# Patient Record
Sex: FEMALE | Race: WHITE | NOT HISPANIC OR LATINO | URBAN - METROPOLITAN AREA
[De-identification: names, ages, dates, MRNs, and addresses within clinical notes are randomized per-mention and may not be internally consistent; named-entity substitution may affect disease eponyms.]

---

## 2018-06-04 ENCOUNTER — INPATIENT (INPATIENT)
Facility: HOSPITAL | Age: 33
LOS: 1 days | Discharge: HOME | End: 2018-06-06
Attending: OBSTETRICS & GYNECOLOGY | Admitting: OBSTETRICS & GYNECOLOGY
Payer: COMMERCIAL

## 2018-06-04 VITALS — WEIGHT: 179.9 LBS | HEIGHT: 66 IN

## 2018-06-04 LAB
ALBUMIN SERPL ELPH-MCNC: 3.7 G/DL — SIGNIFICANT CHANGE UP (ref 3.3–5)
ALP SERPL-CCNC: 113 U/L — SIGNIFICANT CHANGE UP (ref 40–120)
ALT FLD-CCNC: 12 U/L — SIGNIFICANT CHANGE UP (ref 10–45)
ANION GAP SERPL CALC-SCNC: 17 MMOL/L — SIGNIFICANT CHANGE UP (ref 5–17)
APPEARANCE UR: CLEAR — SIGNIFICANT CHANGE UP
AST SERPL-CCNC: 20 U/L — SIGNIFICANT CHANGE UP (ref 10–40)
BACTERIA # UR AUTO: PRESENT /HPF
BASOPHILS NFR BLD AUTO: 0.2 % — SIGNIFICANT CHANGE UP (ref 0–2)
BILIRUB SERPL-MCNC: 0.3 MG/DL — SIGNIFICANT CHANGE UP (ref 0.2–1.2)
BILIRUB UR-MCNC: NEGATIVE — SIGNIFICANT CHANGE UP
BLD GP AB SCN SERPL QL: NEGATIVE — SIGNIFICANT CHANGE UP
BUN SERPL-MCNC: 7 MG/DL — SIGNIFICANT CHANGE UP (ref 7–23)
CALCIUM SERPL-MCNC: 9.4 MG/DL — SIGNIFICANT CHANGE UP (ref 8.4–10.5)
CHLORIDE SERPL-SCNC: 100 MMOL/L — SIGNIFICANT CHANGE UP (ref 96–108)
CO2 SERPL-SCNC: 18 MMOL/L — LOW (ref 22–31)
COLOR SPEC: YELLOW — SIGNIFICANT CHANGE UP
COMMENT - URINE: SIGNIFICANT CHANGE UP
CREAT SERPL-MCNC: 0.51 MG/DL — SIGNIFICANT CHANGE UP (ref 0.5–1.3)
DIFF PNL FLD: ABNORMAL
EOSINOPHIL NFR BLD AUTO: 0.4 % — SIGNIFICANT CHANGE UP (ref 0–6)
EPI CELLS # UR: SIGNIFICANT CHANGE UP /HPF (ref 0–5)
GLUCOSE SERPL-MCNC: 75 MG/DL — SIGNIFICANT CHANGE UP (ref 70–99)
GLUCOSE UR QL: NEGATIVE — SIGNIFICANT CHANGE UP
HCT VFR BLD CALC: 38.5 % — SIGNIFICANT CHANGE UP (ref 34.5–45)
HGB BLD-MCNC: 13.6 G/DL — SIGNIFICANT CHANGE UP (ref 11.5–15.5)
KETONES UR-MCNC: >=80 MG/DL
LEUKOCYTE ESTERASE UR-ACNC: NEGATIVE — SIGNIFICANT CHANGE UP
LYMPHOCYTES # BLD AUTO: 16 % — SIGNIFICANT CHANGE UP (ref 13–44)
MCHC RBC-ENTMCNC: 31.7 PG — SIGNIFICANT CHANGE UP (ref 27–34)
MCHC RBC-ENTMCNC: 35.3 G/DL — SIGNIFICANT CHANGE UP (ref 32–36)
MCV RBC AUTO: 89.7 FL — SIGNIFICANT CHANGE UP (ref 80–100)
MONOCYTES NFR BLD AUTO: 7 % — SIGNIFICANT CHANGE UP (ref 2–14)
NEUTROPHILS NFR BLD AUTO: 76.4 % — SIGNIFICANT CHANGE UP (ref 43–77)
NITRITE UR-MCNC: NEGATIVE — SIGNIFICANT CHANGE UP
PH UR: 7 — SIGNIFICANT CHANGE UP (ref 5–8)
PLATELET # BLD AUTO: 185 K/UL — SIGNIFICANT CHANGE UP (ref 150–400)
POTASSIUM SERPL-MCNC: 3.7 MMOL/L — SIGNIFICANT CHANGE UP (ref 3.5–5.3)
POTASSIUM SERPL-SCNC: 3.7 MMOL/L — SIGNIFICANT CHANGE UP (ref 3.5–5.3)
PROT SERPL-MCNC: 7.3 G/DL — SIGNIFICANT CHANGE UP (ref 6–8.3)
PROT UR-MCNC: NEGATIVE MG/DL — SIGNIFICANT CHANGE UP
RBC # BLD: 4.29 M/UL — SIGNIFICANT CHANGE UP (ref 3.8–5.2)
RBC # FLD: 12 % — SIGNIFICANT CHANGE UP (ref 10.3–16.9)
RBC CASTS # UR COMP ASSIST: > 10 /HPF
RH IG SCN BLD-IMP: POSITIVE — SIGNIFICANT CHANGE UP
RH IG SCN BLD-IMP: POSITIVE — SIGNIFICANT CHANGE UP
SODIUM SERPL-SCNC: 135 MMOL/L — SIGNIFICANT CHANGE UP (ref 135–145)
SP GR SPEC: 1.01 — SIGNIFICANT CHANGE UP (ref 1–1.03)
UROBILINOGEN FLD QL: 0.2 E.U./DL — SIGNIFICANT CHANGE UP
WBC # BLD: 14 K/UL — HIGH (ref 3.8–10.5)
WBC # FLD AUTO: 14 K/UL — HIGH (ref 3.8–10.5)
WBC UR QL: ABNORMAL /HPF

## 2018-06-04 RX ORDER — SODIUM CHLORIDE 9 MG/ML
1000 INJECTION, SOLUTION INTRAVENOUS
Qty: 0 | Refills: 0 | Status: DISCONTINUED | OUTPATIENT
Start: 2018-06-04 | End: 2018-06-05

## 2018-06-04 RX ORDER — SIMETHICONE 80 MG/1
80 TABLET, CHEWABLE ORAL EVERY 6 HOURS
Qty: 0 | Refills: 0 | Status: DISCONTINUED | OUTPATIENT
Start: 2018-06-04 | End: 2018-06-06

## 2018-06-04 RX ORDER — TETANUS TOXOID, REDUCED DIPHTHERIA TOXOID AND ACELLULAR PERTUSSIS VACCINE, ADSORBED 5; 2.5; 8; 8; 2.5 [IU]/.5ML; [IU]/.5ML; UG/.5ML; UG/.5ML; UG/.5ML
0.5 SUSPENSION INTRAMUSCULAR ONCE
Qty: 0 | Refills: 0 | Status: DISCONTINUED | OUTPATIENT
Start: 2018-06-04 | End: 2018-06-06

## 2018-06-04 RX ORDER — OXYTOCIN 10 UNIT/ML
333.33 VIAL (ML) INJECTION
Qty: 20 | Refills: 0 | Status: DISCONTINUED | OUTPATIENT
Start: 2018-06-04 | End: 2018-06-05

## 2018-06-04 RX ORDER — IBUPROFEN 200 MG
600 TABLET ORAL EVERY 6 HOURS
Qty: 0 | Refills: 0 | Status: DISCONTINUED | OUTPATIENT
Start: 2018-06-04 | End: 2018-06-06

## 2018-06-04 RX ORDER — SODIUM CHLORIDE 9 MG/ML
1000 INJECTION, SOLUTION INTRAVENOUS ONCE
Qty: 0 | Refills: 0 | Status: DISCONTINUED | OUTPATIENT
Start: 2018-06-04 | End: 2018-06-05

## 2018-06-04 RX ORDER — ACETAMINOPHEN 500 MG
650 TABLET ORAL EVERY 6 HOURS
Qty: 0 | Refills: 0 | Status: DISCONTINUED | OUTPATIENT
Start: 2018-06-04 | End: 2018-06-06

## 2018-06-04 RX ORDER — GLYCERIN ADULT
1 SUPPOSITORY, RECTAL RECTAL AT BEDTIME
Qty: 0 | Refills: 0 | Status: DISCONTINUED | OUTPATIENT
Start: 2018-06-04 | End: 2018-06-06

## 2018-06-04 RX ORDER — OXYTOCIN 10 UNIT/ML
41.67 VIAL (ML) INJECTION
Qty: 20 | Refills: 0 | Status: DISCONTINUED | OUTPATIENT
Start: 2018-06-04 | End: 2018-06-06

## 2018-06-04 RX ORDER — PRAMOXINE HYDROCHLORIDE 150 MG/15G
1 AEROSOL, FOAM RECTAL EVERY 4 HOURS
Qty: 0 | Refills: 0 | Status: DISCONTINUED | OUTPATIENT
Start: 2018-06-04 | End: 2018-06-06

## 2018-06-04 RX ORDER — MAGNESIUM HYDROXIDE 400 MG/1
30 TABLET, CHEWABLE ORAL
Qty: 0 | Refills: 0 | Status: DISCONTINUED | OUTPATIENT
Start: 2018-06-04 | End: 2018-06-06

## 2018-06-04 RX ORDER — HYDROCORTISONE 1 %
1 OINTMENT (GRAM) TOPICAL EVERY 4 HOURS
Qty: 0 | Refills: 0 | Status: DISCONTINUED | OUTPATIENT
Start: 2018-06-04 | End: 2018-06-06

## 2018-06-04 RX ORDER — AER TRAVELER 0.5 G/1
1 SOLUTION RECTAL; TOPICAL EVERY 4 HOURS
Qty: 0 | Refills: 0 | Status: DISCONTINUED | OUTPATIENT
Start: 2018-06-04 | End: 2018-06-06

## 2018-06-04 RX ORDER — LANOLIN
1 OINTMENT (GRAM) TOPICAL EVERY 6 HOURS
Qty: 0 | Refills: 0 | Status: DISCONTINUED | OUTPATIENT
Start: 2018-06-04 | End: 2018-06-06

## 2018-06-04 RX ORDER — DIBUCAINE 1 %
1 OINTMENT (GRAM) RECTAL EVERY 4 HOURS
Qty: 0 | Refills: 0 | Status: DISCONTINUED | OUTPATIENT
Start: 2018-06-04 | End: 2018-06-06

## 2018-06-04 RX ORDER — DOCUSATE SODIUM 100 MG
100 CAPSULE ORAL
Qty: 0 | Refills: 0 | Status: DISCONTINUED | OUTPATIENT
Start: 2018-06-04 | End: 2018-06-06

## 2018-06-04 RX ORDER — OXYCODONE AND ACETAMINOPHEN 5; 325 MG/1; MG/1
2 TABLET ORAL EVERY 6 HOURS
Qty: 0 | Refills: 0 | Status: DISCONTINUED | OUTPATIENT
Start: 2018-06-04 | End: 2018-06-05

## 2018-06-04 RX ORDER — DIPHENHYDRAMINE HCL 50 MG
25 CAPSULE ORAL EVERY 6 HOURS
Qty: 0 | Refills: 0 | Status: DISCONTINUED | OUTPATIENT
Start: 2018-06-04 | End: 2018-06-06

## 2018-06-04 RX ADMIN — Medication 125 MILLIUNIT(S)/MIN: at 23:33

## 2018-06-04 RX ADMIN — Medication 600 MILLIGRAM(S): at 23:52

## 2018-06-04 RX ADMIN — SODIUM CHLORIDE 125 MILLILITER(S): 9 INJECTION, SOLUTION INTRAVENOUS at 23:34

## 2018-06-04 NOTE — PROGRESS NOTE ADULT - SUBJECTIVE AND OBJECTIVE BOX
DELIVERY NOTE   [x ]ANESTHESIA	[ ]NONE  [x ]EPIDURAL[ ] COMBINED SPINAL EPIDURAL [ ]SPINAL [ ]LOCAL____________      [x ]AGOSTO [ ]MULTIPLE DELIVERY - BABY # ________  (USE SEPARATE FORM FOR SECOND  INFANT)  								[ ]PEDIATRICS  AT DELIVERY  	Patient fully  dilated  and pushing, [ ] LIVE [ ]NONVIABLE.	  [ ]MALE  [ x] FEMALE  APGARS ____8______AND _______9____ Infant delivered from ___OA___position, over:   [ ]INTACT PERINEUM	  [ ]NUCHAL CORD_____________TIME(S)  [ ] REDUCED	[ ]DOUBLY CLAMPED AND CUT AT PERINEUM  [ ] _____DEGREE LACERATION.  [ x]______RML____ EPISIOTOMY.	[ X]EPISIOTOMY  VAGINAL  EXTENSION      [ X] OPERATIVE VAGINAL DELIVERY:     [ X] VERBAL CONSENT FOR FORCEPS +/- EPISIOTOMY OBTAINED:       INDICATION:__FETAL BRADYCARDIA____________________________________________________.  [ ]VACUUM	___________________TYPE.  	[ X] FORCEPS    INSTRUMENT: ______SIMPSON _________________.  	Applied to  Vertex  in _____OA__________Position  at  ____+3/+3_____________station.   Rotation [X]NONE.  [ ] LESS THAN  45 degrees.  [ ] MORE THAN 45 DEGREES.  Number of  pulls =  _____ONE________________.  [ ]INTACT PERINEUM  [ ] __________DEGREE LACERATION.  [X ]____RML______ EPISIOTOMY.	[X ]EPISIOTOMY EXTENSION  RIGHT  VAGINAL     PEDS  AT  [facility]   PLACENTA DELIVERY:  [x  ]Spontaneously   	[x ] Intact  	[ ]Not-intact  	[  ] Removed manually  	[  ] Surgically removed   		[ ]  Dilatation and curettage  		[ ] Horse curette   		[ ] Other:  UMBILICAL CORD:	[ x] Normal,     #_____3_______ Vessels  noted   			[ ] Abnormal:  [x ] Uterine cavity explored  	[ x]Normal  [ x]Empty  	[ ] Not Empty  	[ ] Other.      REPAIR:  	[ ] NONE.  [ ]Laceration REPAIR :		[ X]Episiotomy  REPAIR:	[ X]Episiotomy Extension REPAIR:  Using: 	[ X]____2-0 CHROMIC _______________SUTURES AND [ X]___3-0 CHROMIC___________ SUTURES [ ]Other ___________.  [ X] In layers	[X ] NOTES:  EMOSTASIS  XCELLENT 		  [ X]Rectal  mucosa  Intact		[ ]Not intact:  [ ] Other :      EBL____400___________cc’s  [x ]  MOTHER  AND INFANT TOLERATED  THE DELIVERY WELL RESTING  IN LDR IN STABLE CONDITION  [ ] INFANT TO NICCU:	  [X ] DELIVERY DISCUSSED  WITH PATIENT/SUPPORT PERSON(S)  [X ]NOTES/COMPLICATIONS:	VERBAL CONSENT FOR OUTLET  FORCEPS  OBTAINED  FROM PATIENT  AND HER   , FETAL  BRADYCARDIA  NOTED,  RAMIREZ  CATHETER  REMOVED  AND NICKERSON   OUTLET  FORCEPS  APPLIED  TO  OA  VERTEX , NO ROTATION PERFORMED.	LIVE  FEMALE  INFANT  DELIVERED  FROM OA  POSITION OVER  RML  EPISIOTOMY. INTACT  PLACENTA  DELIVERED  SPONTANEOUSLY 3  VESSEL  CORD  NOTED.   EPISIOTOMY  AND  VAGINAL EXTENSION  REPAIRED IN LAYERS USING  2-0  AND 3-0  CHROMIC  SUTURES,  HEMOSTASIS EXCELLENT.  RECTAL  MUCOSA  INTACT.   EBL 400CC.  DISCUSSED  DELIVERY  WITH PATIENT AND  HER   IN DETAIL  QUESTIONS ANSWERED  IN DETAIL.

## 2018-06-04 NOTE — DISCHARGE NOTE OB - PATIENT PORTAL LINK FT
You can access the [facility] Portal, offered by [facility], by registering with the following website: [email or web address]

## 2018-06-04 NOTE — DISCHARGE NOTE OB - CARE PLAN
Principal Discharge DX:	Normal postpartum course  Goal:	HOME  Assessment and plan of treatment:	NOTHING PER  VAGINA  FOR  6  WEEKS  NO SEX  NO TUB  BATHS  NO SWIMMING

## 2018-06-04 NOTE — DISCHARGE NOTE OB - CARE PROVIDER_API CALL
Alverto Mohamud), Obstetrics and Gynecology  [address] [place] [address]  Phone: [phone]  Fax: [phone]

## 2018-06-05 LAB
HCT VFR BLD CALC: 23.4 % — LOW (ref 34.5–45)
HCT VFR BLD CALC: 31.6 % — LOW (ref 34.5–45)
HGB BLD-MCNC: 11 G/DL — LOW (ref 11.5–15.5)
HGB BLD-MCNC: 7.9 G/DL — LOW (ref 11.5–15.5)
MCHC RBC-ENTMCNC: 31.7 PG — SIGNIFICANT CHANGE UP (ref 27–34)
MCHC RBC-ENTMCNC: 31.9 PG — SIGNIFICANT CHANGE UP (ref 27–34)
MCHC RBC-ENTMCNC: 33.8 G/DL — SIGNIFICANT CHANGE UP (ref 32–36)
MCHC RBC-ENTMCNC: 34.8 G/DL — SIGNIFICANT CHANGE UP (ref 32–36)
MCV RBC AUTO: 91.6 FL — SIGNIFICANT CHANGE UP (ref 80–100)
MCV RBC AUTO: 94 FL — SIGNIFICANT CHANGE UP (ref 80–100)
PLATELET # BLD AUTO: 125 K/UL — LOW (ref 150–400)
PLATELET # BLD AUTO: 165 K/UL — SIGNIFICANT CHANGE UP (ref 150–400)
RBC # BLD: 2.49 M/UL — LOW (ref 3.8–5.2)
RBC # BLD: 3.45 M/UL — LOW (ref 3.8–5.2)
RBC # FLD: 12.1 % — SIGNIFICANT CHANGE UP (ref 10.3–16.9)
RBC # FLD: 12.2 % — SIGNIFICANT CHANGE UP (ref 10.3–16.9)
T PALLIDUM AB TITR SER: NEGATIVE — SIGNIFICANT CHANGE UP
WBC # BLD: 11.4 K/UL — HIGH (ref 3.8–10.5)
WBC # BLD: 22.2 K/UL — HIGH (ref 3.8–10.5)
WBC # FLD AUTO: 11.4 K/UL — HIGH (ref 3.8–10.5)
WBC # FLD AUTO: 22.2 K/UL — HIGH (ref 3.8–10.5)

## 2018-06-05 RX ORDER — OXYCODONE AND ACETAMINOPHEN 5; 325 MG/1; MG/1
1 TABLET ORAL EVERY 4 HOURS
Qty: 0 | Refills: 0 | Status: DISCONTINUED | OUTPATIENT
Start: 2018-06-05 | End: 2018-06-06

## 2018-06-05 RX ORDER — CARBOPROST TROMETHAMINE 250 UG/ML
250 INJECTION, SOLUTION INTRAMUSCULAR ONCE
Qty: 0 | Refills: 0 | Status: COMPLETED | OUTPATIENT
Start: 2018-06-05 | End: 2018-06-05

## 2018-06-05 RX ADMIN — Medication 0.2 MILLIGRAM(S): at 12:12

## 2018-06-05 RX ADMIN — Medication 125 MILLIUNIT(S)/MIN: at 00:49

## 2018-06-05 RX ADMIN — Medication 600 MILLIGRAM(S): at 01:20

## 2018-06-05 RX ADMIN — Medication 1 TABLET(S): at 09:52

## 2018-06-05 RX ADMIN — OXYCODONE AND ACETAMINOPHEN 1 TABLET(S): 5; 325 TABLET ORAL at 21:21

## 2018-06-05 RX ADMIN — Medication 0.2 MILLIGRAM(S): at 05:51

## 2018-06-05 RX ADMIN — Medication 600 MILLIGRAM(S): at 19:05

## 2018-06-05 RX ADMIN — Medication 600 MILLIGRAM(S): at 12:12

## 2018-06-05 RX ADMIN — OXYCODONE AND ACETAMINOPHEN 1 TABLET(S): 5; 325 TABLET ORAL at 22:20

## 2018-06-05 RX ADMIN — Medication 600 MILLIGRAM(S): at 18:04

## 2018-06-05 RX ADMIN — CARBOPROST TROMETHAMINE 250 MICROGRAM(S): 250 INJECTION, SOLUTION INTRAMUSCULAR at 00:49

## 2018-06-05 RX ADMIN — Medication 600 MILLIGRAM(S): at 13:10

## 2018-06-05 RX ADMIN — Medication 650 MILLIGRAM(S): at 10:45

## 2018-06-05 RX ADMIN — Medication 650 MILLIGRAM(S): at 09:52

## 2018-06-05 RX ADMIN — OXYCODONE AND ACETAMINOPHEN 1 TABLET(S): 5; 325 TABLET ORAL at 15:20

## 2018-06-05 RX ADMIN — Medication 0.2 MILLIGRAM(S): at 18:04

## 2018-06-05 RX ADMIN — Medication 1 APPLICATION(S): at 09:51

## 2018-06-05 RX ADMIN — Medication 600 MILLIGRAM(S): at 05:12

## 2018-06-05 RX ADMIN — Medication 100 MILLIGRAM(S): at 09:52

## 2018-06-05 RX ADMIN — OXYCODONE AND ACETAMINOPHEN 1 TABLET(S): 5; 325 TABLET ORAL at 14:28

## 2018-06-05 RX ADMIN — Medication 0.2 MILLIGRAM(S): at 00:50

## 2018-06-05 RX ADMIN — Medication 100 MILLIGRAM(S): at 18:04

## 2018-06-05 NOTE — PROGRESS NOTE ADULT - ASSESSMENT
A/P  33y   s/p , PPD #1  ,stable  1. Pain: well controlled on OPM  2. GI: Regular diet  3. : voiding spontaneously  4. DVT prophylaxis: Ambulation  5. Dispo: PPD 2, unless otherwise specified A/P  33y   s/p forceps assisted vaginal delivery c/b PPH and gHTN, PPD #1  ,stable  1. PPH: normotensive, asymptomatic  2. gHTN: normotensive  3. Pain: well controlled on OPM  4. GI: Regular diet  5. : voiding spontaneously  6. DVT prophylaxis: Ambulation  7. Dispo: PPD 2, unless otherwise specified

## 2018-06-05 NOTE — LACTATION INITIAL EVALUATION - NS LACT CON REASON FOR REQ
Mother reports baby feeding well with no pain. 11 hours old, 39.1, 2 urine/4 stool diapers. Assisted with latch on left side in cross cradle hold. Instructed in positioning and deep latching techniques. Mother states this latch feels different than previous ones and has no discomfort. Baby fed well with wide gape and rhythmic sucking for ~10 minutes, unlatched herself and fell asleep. Reviewed BF guidelines and answered questions. Provided written materials. Encouraged continued offering of breast and s2s. Mother's mother and sister "never made any breastmilk" but pt's breasts grew in size from A to DD and have been leaking colostrum since her second trimester./primaparous mom

## 2018-06-05 NOTE — PROGRESS NOTE ADULT - SUBJECTIVE AND OBJECTIVE BOX
Patient evaluated at bedside.  No acute events overnight.  She reports pain is well controlled with oPM.  She denies heavy vaginal bleeding or perineal discomfort.  She has been ambulating without assistance, had alexis catheter removed this AM, and is breastfeeding.    Physical Exam:  T(C): 36.6 (06-05-18 @ 04:25), Max: 36.6 (06-05-18 @ 04:25)  HR: 67 (06-05-18 @ 04:25) (64 - 92)  BP: 130/81 (06-05-18 @ 04:25) (93/47 - 140/65)  RR: 17 (06-05-18 @ 04:25) (17 - 18)  SpO2: 100% (06-05-18 @ 04:25) (96% - 100%)  Wt(kg): --    GA: NAD, AAOx3  Abd: soft, nontender, nondistended, no rebound or guarding, uterus firm at midline,   fundus below umbilicus  : lochia WNL  Extremities: no swelling or calf tenderness                            11.0   22.2  )-----------( 165      ( 05 Jun 2018 00:57 )             31.6     06-04    135  |  100  |  7   ----------------------------<  75  3.7   |  18<L>  |  0.51    Ca    9.4      04 Jun 2018 19:45    TPro  7.3  /  Alb  3.7  /  TBili  0.3  /  DBili  x   /  AST  20  /  ALT  12  /  AlkPhos  113  06-04

## 2018-06-05 NOTE — PROGRESS NOTE ADULT - SUBJECTIVE AND OBJECTIVE BOX
Postpartum  day 1     Rounds pt  c/o  "  I  think I  am leaking   and  bleed ing  alot  right  now.    Exam  reveled  distended  bladder,  and  atonic  uterus.  expressed  500  cc clots  and  blood  from uterine cavity.  Methergine  0.2 mg  Im  given, Hemabate 250mcgs   im  given,  and  oxytocin  increased  to  40  u  in  ivf.  after  expressing  uterus  hemostasis  was  excellent.  lacerations visualized    and noted  to be intact.  cbc ordered.  will  monitor  pt .  discussed  uterine  atony  with patient and her  .  possibility  of  requiring   a  blood  transfusion discussed.  PT  and    asked  questions and  are compliant  with   plan.      	  OB/GYN ATTENDING POSTPARTUM ROUNDS                                    Subjective: 33yFemale  Complaints: [x ]None	[ ]Other:      Objective:  		T(C): --  HR: --  BP: --  RR: --  SpO2: --  Wt(kg): --    06-04 @ 07:01  -  06-05 @ 00:19  --------------------------------------------------------  IN: 2400 mL / OUT: 1300 mL / NET: 1100 mL        		General:      NAD 	[x ] Yes 	[ ]No,	 A&O X3	[x ] Yes  [ ] No			  		Abdomen:   Palpation  	[x ]Normal	[ ]Other:	     [   ]other:  BS		[ ]Normal 	[ ]Other:  			  LE:  	Calf tenderness    	[x ]None		[ x]No  Sign of DVT	[ ]Other:  		Lochia:	 		[ x]Normal,	[ ]Other:  		Labs:	                      13.6   14.0  )-----------( 185      ( 04 Jun 2018 18:01 )             38.5   06-04    135  |  100  |  7   ----------------------------<  75  3.7   |  18<L>  |  0.51    Ca    9.4      04 Jun 2018 19:45    TPro  7.3  /  Alb  3.7  /  TBili  0.3  /  DBili  x   /  AST  20  /  ALT  12  /  AlkPhos  113  06-04       Assessment:  		[x ]Postpartum:		  			Day #___1_____  				[ ] Other:     	Plan:	1.  Supportive Care		[ ]Other:                                           2. Pain:		[ x] Well Controlled 	[ ] Other:	           	     	3. Misc.		[x ]Continue current care 	[ ] Other:  		  Notes:			[x ] None			[ ] Other:      													Alverto Mohamud MD [phone]

## 2018-06-06 VITALS
DIASTOLIC BLOOD PRESSURE: 68 MMHG | RESPIRATION RATE: 18 BRPM | SYSTOLIC BLOOD PRESSURE: 112 MMHG | HEART RATE: 80 BPM | OXYGEN SATURATION: 98 % | TEMPERATURE: 98 F

## 2018-06-06 PROCEDURE — 85027 COMPLETE CBC AUTOMATED: CPT

## 2018-06-06 PROCEDURE — 86900 BLOOD TYPING SEROLOGIC ABO: CPT

## 2018-06-06 PROCEDURE — 86850 RBC ANTIBODY SCREEN: CPT

## 2018-06-06 PROCEDURE — 86901 BLOOD TYPING SEROLOGIC RH(D): CPT

## 2018-06-06 PROCEDURE — 88307 TISSUE EXAM BY PATHOLOGIST: CPT

## 2018-06-06 PROCEDURE — 85025 COMPLETE CBC W/AUTO DIFF WBC: CPT

## 2018-06-06 PROCEDURE — 86780 TREPONEMA PALLIDUM: CPT

## 2018-06-06 PROCEDURE — 36415 COLL VENOUS BLD VENIPUNCTURE: CPT

## 2018-06-06 PROCEDURE — 81001 URINALYSIS AUTO W/SCOPE: CPT

## 2018-06-06 PROCEDURE — 80053 COMPREHEN METABOLIC PANEL: CPT

## 2018-06-06 RX ORDER — FERROUS SULFATE 325(65) MG
325 TABLET ORAL
Qty: 0 | Refills: 0 | Status: DISCONTINUED | OUTPATIENT
Start: 2018-06-06 | End: 2018-06-06

## 2018-06-06 RX ADMIN — Medication 600 MILLIGRAM(S): at 01:10

## 2018-06-06 RX ADMIN — Medication 325 MILLIGRAM(S): at 07:59

## 2018-06-06 RX ADMIN — Medication 600 MILLIGRAM(S): at 07:00

## 2018-06-06 RX ADMIN — Medication 1 APPLICATION(S): at 11:14

## 2018-06-06 RX ADMIN — OXYCODONE AND ACETAMINOPHEN 1 TABLET(S): 5; 325 TABLET ORAL at 11:14

## 2018-06-06 RX ADMIN — OXYCODONE AND ACETAMINOPHEN 1 TABLET(S): 5; 325 TABLET ORAL at 12:05

## 2018-06-06 RX ADMIN — Medication 0.2 MILLIGRAM(S): at 00:11

## 2018-06-06 RX ADMIN — Medication 600 MILLIGRAM(S): at 06:07

## 2018-06-06 RX ADMIN — Medication 100 MILLIGRAM(S): at 11:14

## 2018-06-06 RX ADMIN — Medication 1 TABLET(S): at 11:14

## 2018-06-06 RX ADMIN — Medication 600 MILLIGRAM(S): at 00:11

## 2018-06-06 RX ADMIN — AER TRAVELER 1 APPLICATION(S): 0.5 SOLUTION RECTAL; TOPICAL at 11:13

## 2018-06-06 NOTE — PROGRESS NOTE ADULT - SUBJECTIVE AND OBJECTIVE BOX
Patient evaluated at bedside.  No acute events overnight.  She reports pain is well controlled with OPM.  She denies heavy vaginal bleeding or perineal discomfort.  She has been ambulating without assistance, voiding spontaneously, and is breastfeeding.    Physical Exam:  T(C): 36.7 (06-06-18 @ 06:19), Max: 36.7 (06-05-18 @ 18:30)  HR: 73 (06-06-18 @ 06:19) (73 - 83)  BP: 105/58 (06-06-18 @ 06:19) (105/58 - 126/73)  RR: 17 (06-06-18 @ 06:19) (16 - 18)  SpO2: 96% (06-06-18 @ 06:19) (96% - 99%)  Wt(kg): --    GA: NAD, AAOx3  Abd: soft, nontender, nondistended, no rebound or guarding, uterus firm at midline,   fundus below umbilicus  : lochia WNL  Extremities: no swelling or calf tenderness                            7.9    11.4  )-----------( 125      ( 05 Jun 2018 21:07 )             23.4     06-04    135  |  100  |  7   ----------------------------<  75  3.7   |  18<L>  |  0.51    Ca    9.4      04 Jun 2018 19:45    TPro  7.3  /  Alb  3.7  /  TBili  0.3  /  DBili  x   /  AST  20  /  ALT  12  /  AlkPhos  113  06-04

## 2018-06-06 NOTE — PROGRESS NOTE ADULT - ASSESSMENT
A/P  33y   s/p forceps assisted vaginal delivery c/b PPH and gHTN, PPD #2 ,stable  1. PPH: VSS, asymptomatic  2. gHTN: normotensive  3. Pain: well controlled on OPM  4. GI: Regular diet  5. : voiding spontaneously  6. DVT prophylaxis: Ambulation  7. Dispo: PPD 2, unless otherwise specified

## 2018-06-10 ENCOUNTER — EMERGENCY (EMERGENCY)
Facility: HOSPITAL | Age: 33
LOS: 1 days | Discharge: HOME | End: 2018-06-10
Attending: EMERGENCY MEDICINE | Admitting: EMERGENCY MEDICINE
Payer: COMMERCIAL

## 2018-06-10 VITALS
HEART RATE: 111 BPM | WEIGHT: 168.65 LBS | OXYGEN SATURATION: 99 % | SYSTOLIC BLOOD PRESSURE: 128 MMHG | HEIGHT: 66 IN | DIASTOLIC BLOOD PRESSURE: 92 MMHG | TEMPERATURE: 98 F | RESPIRATION RATE: 20 BRPM

## 2018-06-10 VITALS
HEART RATE: 69 BPM | SYSTOLIC BLOOD PRESSURE: 115 MMHG | TEMPERATURE: 97 F | RESPIRATION RATE: 18 BRPM | OXYGEN SATURATION: 99 % | DIASTOLIC BLOOD PRESSURE: 77 MMHG

## 2018-06-10 LAB
ANION GAP SERPL CALC-SCNC: 11 MMOL/L — SIGNIFICANT CHANGE UP (ref 5–17)
ANISOCYTOSIS BLD QL: SLIGHT — SIGNIFICANT CHANGE UP
APTT BLD: 29.5 SEC — SIGNIFICANT CHANGE UP (ref 27.5–37.4)
BASOPHILS NFR BLD AUTO: 2 % — SIGNIFICANT CHANGE UP (ref 0–2)
BUN SERPL-MCNC: 9 MG/DL — SIGNIFICANT CHANGE UP (ref 7–23)
CALCIUM SERPL-MCNC: 9.5 MG/DL — SIGNIFICANT CHANGE UP (ref 8.4–10.5)
CHLORIDE SERPL-SCNC: 102 MMOL/L — SIGNIFICANT CHANGE UP (ref 96–108)
CO2 SERPL-SCNC: 23 MMOL/L — SIGNIFICANT CHANGE UP (ref 22–31)
CREAT SERPL-MCNC: 0.51 MG/DL — SIGNIFICANT CHANGE UP (ref 0.5–1.3)
GIANT PLATELETS BLD QL SMEAR: PRESENT — SIGNIFICANT CHANGE UP
GLUCOSE SERPL-MCNC: 105 MG/DL — HIGH (ref 70–99)
HCT VFR BLD CALC: 24.6 % — LOW (ref 34.5–45)
HGB BLD-MCNC: 8.3 G/DL — LOW (ref 11.5–15.5)
INR BLD: 1.01 — SIGNIFICANT CHANGE UP (ref 0.88–1.16)
LG PLATELETS BLD QL AUTO: PRESENT — SIGNIFICANT CHANGE UP
LYMPHOCYTES # BLD AUTO: 18 % — SIGNIFICANT CHANGE UP (ref 13–44)
MANUAL SMEAR VERIFICATION: SIGNIFICANT CHANGE UP
MCHC RBC-ENTMCNC: 31 PG — SIGNIFICANT CHANGE UP (ref 27–34)
MCHC RBC-ENTMCNC: 33.7 G/DL — SIGNIFICANT CHANGE UP (ref 32–36)
MCV RBC AUTO: 91.8 FL — SIGNIFICANT CHANGE UP (ref 80–100)
METAMYELOCYTES # FLD: 4 % — HIGH
MONOCYTES NFR BLD AUTO: 4 % — SIGNIFICANT CHANGE UP (ref 2–14)
MYELOCYTES NFR BLD: 2 % — HIGH
NEUTROPHILS NFR BLD AUTO: 70 % — SIGNIFICANT CHANGE UP (ref 43–77)
NRBC # BLD: 1 /100 WBCS — HIGH
PLAT MORPH BLD: ABNORMAL
PLATELET # BLD AUTO: 332 K/UL — SIGNIFICANT CHANGE UP (ref 150–400)
POLYCHROMASIA BLD QL SMEAR: SLIGHT — SIGNIFICANT CHANGE UP
POTASSIUM SERPL-MCNC: 4.6 MMOL/L — SIGNIFICANT CHANGE UP (ref 3.5–5.3)
POTASSIUM SERPL-SCNC: 4.6 MMOL/L — SIGNIFICANT CHANGE UP (ref 3.5–5.3)
PROTHROM AB SERPL-ACNC: 11.2 SEC — SIGNIFICANT CHANGE UP (ref 9.8–12.7)
RBC # BLD: 2.68 M/UL — LOW (ref 3.8–5.2)
RBC # FLD: 12.8 % — SIGNIFICANT CHANGE UP (ref 10.3–16.9)
RBC BLD AUTO: ABNORMAL
SODIUM SERPL-SCNC: 136 MMOL/L — SIGNIFICANT CHANGE UP (ref 135–145)
WBC # BLD: 7 K/UL — SIGNIFICANT CHANGE UP (ref 3.8–10.5)
WBC # FLD AUTO: 7 K/UL — SIGNIFICANT CHANGE UP (ref 3.8–10.5)

## 2018-06-10 PROCEDURE — 85730 THROMBOPLASTIN TIME PARTIAL: CPT

## 2018-06-10 PROCEDURE — 36415 COLL VENOUS BLD VENIPUNCTURE: CPT

## 2018-06-10 PROCEDURE — 80048 BASIC METABOLIC PNL TOTAL CA: CPT

## 2018-06-10 PROCEDURE — 87070 CULTURE OTHR SPECIMN AEROBIC: CPT

## 2018-06-10 PROCEDURE — 99284 EMERGENCY DEPT VISIT MOD MDM: CPT

## 2018-06-10 PROCEDURE — 85610 PROTHROMBIN TIME: CPT

## 2018-06-10 PROCEDURE — 85025 COMPLETE CBC W/AUTO DIFF WBC: CPT

## 2018-06-10 PROCEDURE — 87186 SC STD MICRODIL/AGAR DIL: CPT

## 2018-06-10 PROCEDURE — 99284 EMERGENCY DEPT VISIT MOD MDM: CPT | Mod: 25

## 2018-06-10 RX ORDER — SODIUM CHLORIDE 9 MG/ML
1000 INJECTION INTRAMUSCULAR; INTRAVENOUS; SUBCUTANEOUS ONCE
Qty: 0 | Refills: 0 | Status: COMPLETED | OUTPATIENT
Start: 2018-06-10 | End: 2018-06-10

## 2018-06-10 RX ADMIN — SODIUM CHLORIDE 2000 MILLILITER(S): 9 INJECTION INTRAMUSCULAR; INTRAVENOUS; SUBCUTANEOUS at 13:08

## 2018-06-10 NOTE — ED ADULT NURSE NOTE - OBJECTIVE STATEMENT
Patient states had vaginal delivery last Monday w/ double episiotomy, states having severe pain on site w/ pustular discharge w/ bleeding, not sure if vaginal bleed or from episiotomy site.  Denies fevers, states had episodes of nausea and vomitting w/ passing out last Thursday due to lightheadedness w/ confusion.  States Percocet not working.  Currently breastfeeding.

## 2018-06-10 NOTE — CONSULT NOTE ADULT - ASSESSMENT
34yo P1 PPD6 s/p forceps assisted vaginal delivery c/b PPH presents with incisional pain at the episiotomy and persistent vaginal bleeding. incision appears to be healing appropriately on exam w/o signs of infection. WBC WNL. Patient counseled to continue motrin 600mg q6hr ATC and percocet as needed for pain. Pt counseled to call OB and return to Ed if she experiences purulent discharge from the incision worsening pain or fever greater than 100.4F. Patient appear to have normal lochia on exam. Hgb uptrending since discharge (7.9>8.3). Patient instructed to stay hydrated and continue taking PO iron. Patient to follow up with Dr. Mohamud at schedule Postpartum appt.    Plan discussed with dr. mohamud.

## 2018-06-10 NOTE — ED ADULT TRIAGE NOTE - CHIEF COMPLAINT QUOTE
Patient had vaginal delivery 06/04/18 with double episiotomy , came here for infected episiotomy site and heavy vaginal bleeding with clots since monday .  Was sent by GYN for evaluation .

## 2018-06-10 NOTE — ED PROVIDER NOTE - OBJECTIVE STATEMENT
s/p recent delivery 5 d ago with double episiotomy, here with increased pain in vaginal area and bleeding.  States she is taking percocet without relief.  Feels like she is getting worse, not better.  Spouse noted some yellow discharge in area of stitches.  4 days ago she was sitting at table and felt lightheaded then passed out.  Denies abd pain, fever/chills, urinary symptoms.  Notes pain increased with any movements.  Talked to gyn and told to come to ED

## 2018-06-10 NOTE — ED ADULT NURSE REASSESSMENT NOTE - NS ED NURSE REASSESS COMMENT FT1
Patient a/ox 3, c/o of pain on episiotomy site, no active bleed, no pus discharge, no fevers or chills.  Vital signs stable.  Left FA PIV #20 in place, all labs sent, no complications.  NSS 1 L bolus ongoing.  Seen by GYNE; disposition pending.

## 2018-06-10 NOTE — CONSULT NOTE ADULT - SUBJECTIVE AND OBJECTIVE BOX
34yo P1 PPD6 s/p forceps assisted vaginal delivery c/b PPH presents with incisional pain at the episiotomy and persistent vaginal bleeding. Patient had right mediolateral episiotomy performed during vaginal delivery. She notes severe pain at the incision site that is not improving. Percocet and motrin ease the pain. She notes the pain is making it difficult to walk and sit. She notes yellow discharge on her sanitary pads but denies fever or chills. Patient had a PPH of 500cc on PPD1 received methergine x1, hemabate x1. Hgb on discharge was 7.9. Since then she reports going through 8 pads a day. She notes intermittent lightheadedness and 1 syncopal episode on Thursday. She is taking 325mg ferrous sulfate daily.     Vital Signs Last 24 Hrs  T(C): 36.7 (10 Chaparro 2018 12:33), Max: 36.7 (10 Chaparro 2018 12:33)  T(F): 98 (10 Chaparro 2018 12:33), Max: 98 (10 Chaparro 2018 12:33)  HR: 111 (10 Chaparro 2018 12:33) (111 - 111)  BP: 128/92 (10 Chaparro 2018 12:33) (128/92 - 128/92)  BP(mean): --  RR: 20 (10 Chaparro 2018 12:33) (20 - 20)  SpO2: 99% (10 Chaparro 2018 12:33) (99% - 99%)  Gen: NAD  Card: RRR no m/r/g  Pulm: CTAB no crackles or wheezes  Abd: soft, mildly tender to palpation lower abdomen, fundus firm below the umbilicus  : well-healing right mediolateral episiotomy, b/l labial edema, incision nonerythematous, incision tender to palpation, no fluctuance or induration, no purulent discharge appreciated or expressed. normal appearing lochia.  Ext: no calf edema or tenderness bilaterally                          8.3    7.0   )-----------( 332      ( 10 Chaparro 2018 13:06 )             24.6 34yo  PPD6 s/p forceps assisted vaginal delivery c/b PPH presents with incisional pain at the episiotomy and persistent vaginal bleeding. Patient had right mediolateral episiotomy performed during vaginal delivery. She notes severe pain at the incision site that is not improving. Percocet and motrin ease the pain. She notes the pain is making it difficult to walk and sit. She notes yellow discharge on her sanitary pads but denies fever or chills. Patient had a PPH of 500cc on PPD1 received methergine x1, hemabate x1. Hgb on discharge was 7.9. Since then she reports going through 8 pads a day. She notes intermittent lightheadedness and 1 syncopal episode on Thursday. She is taking 325mg ferrous sulfate daily.       OBHx: G1 - curernt  GYNHx: endometriosis  PMH: denies  PSH:  endometriosis resection,  rhinoplasty,  tonsillectomy  Meds: PNV  NKDA    Vital Signs Last 24 Hrs  T(C): 36.7 (10 Chaparro 2018 12:33), Max: 36.7 (10 Chaparro 2018 12:33)  T(F): 98 (10 Chaparro 2018 12:33), Max: 98 (10 Chaparro 2018 12:33)  HR: 111 (10 Chaparro 2018 12:33) (111 - 111)  BP: 128/92 (10 Chaparro 2018 12:33) (128/92 - 128/92)  BP(mean): --  RR: 20 (10 Chaparro 2018 12:33) (20 - 20)  SpO2: 99% (10 Chaparro 2018 12:33) (99% - 99%)  Gen: NAD  Card: RRR no m/r/g  Pulm: CTAB no crackles or wheezes  Abd: soft, mildly tender to palpation lower abdomen, fundus firm below the umbilicus  : well-healing right mediolateral episiotomy, b/l labial edema, incision nonerythematous, incision tender to palpation, no fluctuance or induration, no purulent discharge appreciated or expressed. normal appearing lochia.  Ext: no calf edema or tenderness bilaterally                          8.3    7.0   )-----------( 332      ( 10 Chaparro 2018 13:06 )             24.6

## 2018-06-11 DIAGNOSIS — Z3A.39 39 WEEKS GESTATION OF PREGNANCY: ICD-10-CM

## 2018-06-11 DIAGNOSIS — Z34.03 ENCOUNTER FOR SUPERVISION OF NORMAL FIRST PREGNANCY, THIRD TRIMESTER: ICD-10-CM

## 2018-06-11 LAB
GRAM STN FLD: SIGNIFICANT CHANGE UP
SPECIMEN SOURCE: SIGNIFICANT CHANGE UP

## 2018-06-12 LAB
CULTURE RESULTS: SIGNIFICANT CHANGE UP
CULTURE RESULTS: SIGNIFICANT CHANGE UP
SPECIMEN SOURCE: SIGNIFICANT CHANGE UP

## 2018-06-13 LAB
-  CEFAZOLIN: SIGNIFICANT CHANGE UP
-  CLINDAMYCIN: SIGNIFICANT CHANGE UP
-  ERYTHROMYCIN: SIGNIFICANT CHANGE UP
-  LINEZOLID: SIGNIFICANT CHANGE UP
-  OXACILLIN: SIGNIFICANT CHANGE UP
-  PENICILLIN: SIGNIFICANT CHANGE UP
-  RIFAMPIN: SIGNIFICANT CHANGE UP
-  TRIMETHOPRIM/SULFAMETHOXAZOLE: SIGNIFICANT CHANGE UP
-  VANCOMYCIN: SIGNIFICANT CHANGE UP
CULTURE RESULTS: SIGNIFICANT CHANGE UP
METHOD TYPE: SIGNIFICANT CHANGE UP
ORGANISM # SPEC MICROSCOPIC CNT: SIGNIFICANT CHANGE UP
ORGANISM # SPEC MICROSCOPIC CNT: SIGNIFICANT CHANGE UP

## 2018-06-14 DIAGNOSIS — R10.2 PELVIC AND PERINEAL PAIN: ICD-10-CM

## 2018-06-14 LAB — SURGICAL PATHOLOGY STUDY: SIGNIFICANT CHANGE UP

## 2020-08-11 NOTE — ED ADULT NURSE NOTE - CARDIO WDL
08/11/20 1230   Pain Assessment   Pain Assessment Tool Pain Assessment not indicated - pt denies pain   Pain Score No Pain   Restrictions/Precautions   Precautions Bed/chair alarms;Cognitive; Fall Risk;Supervision on toilet/commode   Cognition   Overall Cognitive Status Impaired   Subjective   Subjective pt ready to go home, pt states balance is still off   Roll Left and Right   Type of Assistance Needed Independent   Roll Left and Right CARE Score 6   Sit to Lying   Type of Assistance Needed Independent   Sit to Lying CARE Score 6   Lying to Sitting on Side of Bed   Type of Assistance Needed Independent   Amount of Physical Assistance Provided No physical assistance   Lying to Sitting on Side of Bed CARE Score 6   Sit to Stand   Type of Assistance Needed Supervision   Amount of Physical Assistance Provided No physical assistance   Sit to Stand CARE Score 4   Bed-Chair Transfer   Type of Assistance Needed Supervision   Chair/Bed-to-Chair Transfer CARE Score 4   Car Transfer   Type of Assistance Needed Set-up / clean-up   Car Transfer CARE Score 5   Walk 10 Feet   Type of Assistance Needed Supervision   Walk 10 Feet CARE Score 4   Walk 50 Feet with Two Turns   Type of Assistance Needed Supervision   Walk 50 Feet with Two Turns CARE Score 4   Walk 150 Feet   Type of Assistance Needed Supervision   Walk 150 Feet CARE Score 4   Walking 10 Feet on Uneven Surfaces   Type of Assistance Needed Supervision   Walking 10 Feet on Uneven Surfaces CARE Score 4   Ambulation   Does the patient walk? 2  Yes   Distance Walked (feet) 350 ft   Findings no AD   Wheelchair mobility   Does the patient use a wheelchair? 0  No   Curb or Single Stair   Style negotiated Curb   Type of Assistance Needed Supervision   1 Step (Curb) CARE Score 4   4 Steps   Type of Assistance Needed Supervision; Adaptive equipment   4 Steps CARE Score 4   Picking Up Object   Type of Assistance Needed Set-up / clean-up   Comment with reacher   Picking Up Object CARE Score 5   Therapeutic Interventions   Strengthening seated LAQ, hip flex and hip abd x30 each 3# AW   Flexibility B gastroc and HS x3mins   Neuromuscular Re-Education stepping over bolsters and dowels  ball toss fwd/bwd   Equipment Use   NuStep L2 10mins   Assessment   Treatment Assessment pt cont to function at Sup level for d/c home with family tomorrow due to decrease endurance and activity tolerance  due to cognitive deficits pt with decrease safety awareness and recommendation for 24/7 Sup to reduce fall risk at home  will benefit from cont therapy upon d/c to improve balance and endurance to progress with functional ind and safety  Family/Caregiver Present no   Problem List Decreased endurance; Impaired balance;Decreased cognition;Decreased coordination;Decreased safety awareness   Barriers to Discharge Decreased caregiver support   PT Barriers   Functional Limitation Walking;Stair negotiation   Plan   Treatment/Interventions Functional transfer training; Endurance training;Gait training   Progress Progressing toward goals   Recommendation   PT Discharge Recommendation Home with skilled therapy   PT Therapy Minutes   PT Time In 1230   PT Time Out 1400   PT Total Time (minutes) 90   PT Mode of treatment - Individual (minutes) 90   PT Mode of treatment - Concurrent (minutes) 0   PT Mode of treatment - Group (minutes) 0   PT Mode of treatment - Co-treat (minutes) 0   PT Mode of Treatment - Total time(minutes) 90 minutes   PT Cumulative Minutes 890   Therapy Time missed   Time missed?  No Normal rate, regular rhythm, normal S1, S2 heart sounds heard.

## 2022-01-17 NOTE — ED ADULT NURSE NOTE - NS ED NURSE LEVEL OF CONSCIOUSNESS AFFECT
Denis Hart (:  1978) is a 41 y.o. female,Established patient, here for evaluation of the following chief complaint(s): Otitis Media (x 2 days, sharp has taken advil for pain)    ASSESSMENT/PLAN:    Densi Hart is 40 y/o female here today for right ear pain in setting of COVID 19 infection. Pt positive for covid 19 6 days ago after starting with symptoms in preceding week with lack of taste, then viral like symptoms. Developed right ear pain, no hearing change, no discharge. Feels painful down in ear more than outside ear or canal or tragus area or mastoid area. Hard without visualization to make definitive diagnosis but will treat empirically for OM/otalgia. Differential includes otitis media vs otitis externa vs mastoiditis vs eustachian tube dysfunction vs post viral vestibular neuritis vs post covid 19 sequelae vs other etiology. Supportive care. Oral abx, pcn allergic, 3rd gen cephalosporin, lower threshold for reevaluation vs ENT referral.    1. Otalgia, right ear  2. Right non-suppurative otitis media  -     cefdinir (OMNICEF) 300 MG capsule; Take 1 capsule by mouth 2 times daily for 10 days, Disp-20 capsule, R-0Normal      Return in about 10 weeks (around 3/28/2022), or if symptoms worsen or fail to improve. SUBJECTIVE/OBJECTIVE:    Denis Hart is 40 y/o female who presents today for otalgia. Dull pressure in right ear  Owed to congestion  Now sharp  Hurts when pushes on the ear or pulls on it  No changes in her hearing  No home remedies such as debrox or irrigation of ear  Does use qtips  No discharge from ear  Rarely gets ear infection   Patient had lack of taste 1 week ago, then woke up Tuesday morning with fever/chills,aches, sore throat  Home test was positive.   Taste has just started coming back  Smell improving  Has taken ibuprofen and it helped  Pain comes back when advil wears off         Review of Systems   Constitutional: Negative for activity change, appetite change, chills, diaphoresis and fever. HENT: Positive for ear pain and postnasal drip. Negative for congestion, ear discharge, hearing loss, sinus pressure, sore throat, trouble swallowing and voice change. Eyes: Negative for discharge, itching and visual disturbance. Respiratory: Positive for cough. Negative for chest tightness, shortness of breath and wheezing. Pressure on chest at times   Cardiovascular: Negative for chest pain and palpitations. Gastrointestinal: Positive for diarrhea and vomiting. Negative for abdominal pain, blood in stool, constipation and nausea. Improved some diarrhea and vomiting initially   Endocrine: Negative for polyuria. Genitourinary: Negative for difficulty urinating, dysuria and hematuria. Musculoskeletal: Negative for arthralgias and myalgias. Skin: Negative for rash. Allergic/Immunologic: Negative for environmental allergies and food allergies. Neurological: Negative for weakness, numbness and headaches. Psychiatric/Behavioral: Negative for dysphoric mood and sleep disturbance. The patient is not nervous/anxious. Patient-Reported Vitals 1/17/2022   Patient-Reported Weight 140lb   Patient-Reported Height 5 5   Patient-Reported Pulse 85   Patient-Reported SpO2 97        Physical Exam  Constitutional:       Appearance: Normal appearance. She is ill-appearing. HENT:      Head: Normocephalic and atraumatic. Eyes:      Extraocular Movements: Extraocular movements intact. Pulmonary:      Effort: Pulmonary effort is normal.   Neurological:      General: No focal deficit present. Mental Status: She is alert and oriented to person, place, and time. Mental status is at baseline. Psychiatric:         Mood and Affect: Mood normal.         Behavior: Behavior normal.         Thought Content:  Thought content normal.         On this date 1/17/2022 I have spent 30 minutes reviewing previous notes, test results and face to face (virtual) with the patient discussing the diagnosis and importance of compliance with the treatment plan as well as documenting on the day of the visit. \"THIS VISIT WAS COMPLETED VIRTUALLY USING [employer] ME\"     Faye Apley, was evaluated through a synchronous (real-time) audio-video encounter. The patient (or guardian if applicable) is aware that this is a billable service. Verbal consent to proceed has been obtained within the past 12 months. The visit was conducted pursuant to the emergency declaration under the [address] [place] [address] waiver authority and the [employer] General Act. Patient identification was verified, and a caregiver was present when appropriate. The patient was located in a state where the provider was credentialed to provide care. An electronic signature was used to authenticate this note. Agustin Sutherland.  Giselle Plaza, DO  1/17/2022 Anxious

## 2022-02-18 ENCOUNTER — OFFICE VISIT (OUTPATIENT)
Dept: FAMILY MEDICINE CLINIC | Facility: CLINIC | Age: 37
End: 2022-02-18

## 2022-02-18 VITALS
TEMPERATURE: 97.3 F | OXYGEN SATURATION: 100 % | BODY MASS INDEX: 23.63 KG/M2 | RESPIRATION RATE: 16 BRPM | DIASTOLIC BLOOD PRESSURE: 80 MMHG | WEIGHT: 147 LBS | HEIGHT: 66 IN | HEART RATE: 51 BPM | SYSTOLIC BLOOD PRESSURE: 118 MMHG

## 2022-02-18 DIAGNOSIS — Z00.00 ENCOUNTER FOR HEALTH MAINTENANCE EXAMINATION IN ADULT: Primary | ICD-10-CM

## 2022-02-18 DIAGNOSIS — Z11.59 ENCOUNTER FOR HEPATITIS C SCREENING TEST FOR LOW RISK PATIENT: ICD-10-CM

## 2022-02-18 DIAGNOSIS — R53.83 FATIGUE, UNSPECIFIED TYPE: ICD-10-CM

## 2022-02-18 DIAGNOSIS — R63.5 WEIGHT GAIN: ICD-10-CM

## 2022-02-18 DIAGNOSIS — F41.1 ANXIETY, GENERALIZED: ICD-10-CM

## 2022-02-18 DIAGNOSIS — Z13.6 SCREENING, ISCHEMIC HEART DISEASE: ICD-10-CM

## 2022-02-18 PROCEDURE — 99385 PREV VISIT NEW AGE 18-39: CPT

## 2022-02-18 PROCEDURE — 99203 OFFICE O/P NEW LOW 30 MIN: CPT

## 2022-02-18 NOTE — ASSESSMENT & PLAN NOTE
Counseled patient to continue eating a diet high in vegetable, lean meat, whole grains make sure she is getting adequate protein intake and calories.  Exercise 150 minutes a week.  Immunizations are up-to-date.  Patient is following up with her GYN in 1 month for Pap smear.  Obtaining lab work today we will follow-up when labs are completed.

## 2022-02-18 NOTE — ASSESSMENT & PLAN NOTE
Overall well controlled with diet and exercise and patient has a good support system through her  who helps her during her times of anxiety.  Patient does believe she needs medication at this time but discussed that if anxiety gets worse to return to clinic and we can discuss options.

## 2022-02-18 NOTE — ASSESSMENT & PLAN NOTE
Obtaining lab work today to assess cause of recent weight gain.  Patient is to follow-up with GYN in 1 month to discuss possible hormonal imbalances causing it.  We will follow-up when labs have resulted.

## 2022-02-18 NOTE — PROGRESS NOTES
[facility] Complaint  Establish Care, Weight Gain (WEIGHT GAIN AFTER THE BIRTH OF HER BABY, WANTS TO DISCUSS OPTIONS.), and Annual Exam (if poss, wants blood work done, pt is fasting)    Subjective          Marj Mathias presents to [facility]  History of Present Illness     Patient presents the office to establish care and for annual physical and to discuss weight gain.  Patient recently moved 2 years ago from [facility] and patient previously saw PCP in [place].  Patient said she did request all of her records to be sent to our office yesterday and awaiting those to be uploaded.  Patient said she was previously being treated for ADHD and anxiety.  Both of those were being treated with Adderall.  However when she got pregnant with her first child she came off the Adderall and has been managing both ADHD and anxiety with lifestyle changes through diet and exercise.  Overall both are well controlled with good family support.    Patient wanting to discuss recent 7 pound weight gain over the past 2 months.  About 2 months ago patient stopped breast-feeding her second child and noticed rapid 7 pound weight gain that has fluctuated over the past couple months.  Patient exercises 5 days a week and is eating a well-balanced diet and still the weight has stayed on.  Patient does wonder about getting overall labs done today.  Patient is following up with her GYN to discuss hormones as well in 1 month.  Patient said she does have some fatigue and dry skin.  No hair loss or constipation.  No history of anemia.  No history of vitamin D deficiency.      Annual Exam.      Health Maintenance   Topic Date Due   • ANNUAL PHYSICAL  Never done   • HEPATITIS C SCREENING  Never done   • PAP SMEAR  Never done   • TDAP/TD VACCINES (2 - Td or Tdap) 08/12/2030   • COVID-19 Vaccine  Completed   • INFLUENZA VACCINE  Completed   • Pneumococcal Vaccine 0-64  Aged Out       Diet: avoids dairy and processed  "foods. Eats well.   Exercise: 5 days a week with orange theory and spin  GYN: Appointment in 1 month for PAP  Immunizations: Up to date  Colon cancer screening: NA  Sleep/mental health: Sleeps well, 7 hours. Mental health great. PHQ 9 is 0.   Dental: Every 6 months.  Nonsmoker - 3 drinks a week.       Objective   Vital Signs:   /80   Pulse 51   Temp 97.3 °F (36.3 °C)   Resp 16   Ht 167.6 cm (66\")   Wt 66.7 kg (147 lb)   SpO2 100%   BMI 23.73 kg/m²     Physical Exam  HENT:      Head: Normocephalic.   Eyes:      Pupils: Pupils are equal, round, and reactive to light.   Cardiovascular:      Rate and Rhythm: Normal rate and regular rhythm.      Pulses: Normal pulses.      Heart sounds: Normal heart sounds.   Pulmonary:      Effort: Pulmonary effort is normal.      Breath sounds: Normal breath sounds.   Abdominal:      General: Bowel sounds are normal.      Palpations: Abdomen is soft.   Musculoskeletal:         General: Normal range of motion.      Cervical back: Normal range of motion.   Skin:     General: Skin is warm and dry.      Capillary Refill: Capillary refill takes less than 2 seconds.   Neurological:      General: No focal deficit present.      Mental Status: She is alert.   Psychiatric:         Mood and Affect: Mood normal.        Result Review :{Labs  Result Review  Imaging  Med Tab  Media  Procedures :23}                 Assessment and Plan    Diagnoses and all orders for this visit:    1. Encounter for health maintenance examination in adult (Primary)  Assessment & Plan:  Counseled patient to continue eating a diet high in vegetable, lean meat, whole grains make sure she is getting adequate protein intake and calories.  Exercise 150 minutes a week.  Immunizations are up-to-date.  Patient is following up with her GYN in 1 month for Pap smear.  Obtaining lab work today we will follow-up when labs are completed.      2. Weight gain  Assessment & Plan:  Obtaining lab work today to assess " cause of recent weight gain.  Patient is to follow-up with GYN in 1 month to discuss possible hormonal imbalances causing it.  We will follow-up when labs have resulted.    Orders:  -     Comprehensive Metabolic Panel  -     TSH  -     T4, free    3. Fatigue, unspecified type  Assessment & Plan:  Checking labs today.  We will follow-up when labs have resulted.    Orders:  -     CBC & Differential  -     Comprehensive Metabolic Panel  -     TSH  -     Vitamin D 25 hydroxy  -     T4, free    4. Anxiety, generalized  Assessment & Plan:  Overall well controlled with diet and exercise and patient has a good support system through her  who helps her during her times of anxiety.  Patient does believe she needs medication at this time but discussed that if anxiety gets worse to return to clinic and we can discuss options.      5. Screening, ischemic heart disease  -     Lipid Panel    6. Encounter for hepatitis C screening test for low risk patient  -     Hepatitis C antibody    Pleasant 36-year-old female presents to clinic to establish care, annual exam, and to discuss weight gain.  See plan above.  We are obtaining lab work today to assess cause of weight gain, fatigue, and for her annual exam.  We will follow up with a phone call to discuss lab results and if we need to do any other follow-up labs or have a follow-up appointment.  If everything normal follow-up in 1 year for annual physical exam.      Follow Up   Return in about 1 year (around 2/18/2023) for Annual physical.  Patient was given instructions and counseling regarding her condition or for health maintenance advice. Please see specific information pulled into the AVS if appropriate.     Medical assistant and I wore mask and eyewear protection during entire encounter.  Patient wore mask.      Electronically signed by RICKY Herrmann, 02/18/22, 10:10 AM EST.

## 2022-02-19 LAB
25(OH)D3+25(OH)D2 SERPL-MCNC: 28.9 NG/ML (ref 30–100)
ALBUMIN SERPL-MCNC: 4.8 G/DL (ref 3.8–4.8)
ALBUMIN/GLOB SERPL: 2.1 {RATIO} (ref 1.2–2.2)
ALP SERPL-CCNC: 56 IU/L (ref 44–121)
ALT SERPL-CCNC: 19 IU/L (ref 0–32)
AST SERPL-CCNC: 18 IU/L (ref 0–40)
BASOPHILS # BLD AUTO: 0 X10E3/UL (ref 0–0.2)
BASOPHILS NFR BLD AUTO: 1 %
BILIRUB SERPL-MCNC: 0.4 MG/DL (ref 0–1.2)
BUN SERPL-MCNC: 9 MG/DL (ref 6–20)
BUN/CREAT SERPL: 13 (ref 9–23)
CALCIUM SERPL-MCNC: 9.3 MG/DL (ref 8.7–10.2)
CHLORIDE SERPL-SCNC: 102 MMOL/L (ref 96–106)
CHOLEST SERPL-MCNC: 177 MG/DL (ref 100–199)
CO2 SERPL-SCNC: 23 MMOL/L (ref 20–29)
CREAT SERPL-MCNC: 0.67 MG/DL (ref 0.57–1)
EOSINOPHIL # BLD AUTO: 0 X10E3/UL (ref 0–0.4)
EOSINOPHIL NFR BLD AUTO: 1 %
ERYTHROCYTE [DISTWIDTH] IN BLOOD BY AUTOMATED COUNT: 11.5 % (ref 11.7–15.4)
GLOBULIN SER CALC-MCNC: 2.3 G/DL (ref 1.5–4.5)
GLUCOSE SERPL-MCNC: 77 MG/DL (ref 65–99)
HCT VFR BLD AUTO: 42 % (ref 34–46.6)
HCV AB S/CO SERPL IA: 0.1 S/CO RATIO (ref 0–0.9)
HDLC SERPL-MCNC: 63 MG/DL
HGB BLD-MCNC: 13.7 G/DL (ref 11.1–15.9)
IMM GRANULOCYTES # BLD AUTO: 0 X10E3/UL (ref 0–0.1)
IMM GRANULOCYTES NFR BLD AUTO: 0 %
LDLC SERPL CALC-MCNC: 99 MG/DL (ref 0–99)
LYMPHOCYTES # BLD AUTO: 1.7 X10E3/UL (ref 0.7–3.1)
LYMPHOCYTES NFR BLD AUTO: 25 %
MCH RBC QN AUTO: 30.4 PG (ref 26.6–33)
MCHC RBC AUTO-ENTMCNC: 32.6 G/DL (ref 31.5–35.7)
MCV RBC AUTO: 93 FL (ref 79–97)
MONOCYTES # BLD AUTO: 0.4 X10E3/UL (ref 0.1–0.9)
MONOCYTES NFR BLD AUTO: 6 %
NEUTROPHILS # BLD AUTO: 4.5 X10E3/UL (ref 1.4–7)
NEUTROPHILS NFR BLD AUTO: 67 %
PLATELET # BLD AUTO: 267 X10E3/UL (ref 150–450)
POTASSIUM SERPL-SCNC: 4.7 MMOL/L (ref 3.5–5.2)
PROT SERPL-MCNC: 7.1 G/DL (ref 6–8.5)
RBC # BLD AUTO: 4.5 X10E6/UL (ref 3.77–5.28)
SODIUM SERPL-SCNC: 142 MMOL/L (ref 134–144)
T4 FREE SERPL-MCNC: 1.29 NG/DL (ref 0.82–1.77)
TRIGL SERPL-MCNC: 81 MG/DL (ref 0–149)
TSH SERPL DL<=0.005 MIU/L-ACNC: 1.67 UIU/ML (ref 0.45–4.5)
VLDLC SERPL CALC-MCNC: 15 MG/DL (ref 5–40)
WBC # BLD AUTO: 6.6 X10E3/UL (ref 3.4–10.8)

## 2022-05-17 ENCOUNTER — TELEPHONE (OUTPATIENT)
Dept: FAMILY MEDICINE CLINIC | Facility: CLINIC | Age: 37
End: 2022-05-17

## 2022-05-17 NOTE — TELEPHONE ENCOUNTER
Pt called because she was upset about a bill she reviewed from her new patient appointment in FEB. She already spoke with billing and she stated that they tried to re code but it was denied by the office.     She states she was coded for a preventative & an office visit. Pt stated that she didn't discuss anything outside of a preventative visit. According to the office notes, she wanted to discuss weight gain after her baby but states that she physically did not bring it up and Mae is the who asked her about her post partum    She feels taken advantage of for being charged for both when she states that she only spoke with Mae for 10 min    She would like us to re-coded and remove the office visit. She also states that her bill might be due soon and she wants us to flag her account so it doesn't go into collections while she waits for the recoding.     She would like to speak to the office manger.     Please Advise.

## 2022-05-19 NOTE — TELEPHONE ENCOUNTER
FYI - I contacted the billing office and the coding review is complete. It was determined the coding was correct since abnormal weight and anxiety are not covered diagnosis for preventative care. I will follow up with patient after I return to the office next week.

## 2023-01-04 NOTE — ED PROVIDER NOTE - MEDICAL DECISION MAKING DETAILS
I was present during the key portion of the procedure. pain in area of episiotomy.  seen by gyn.  does not appear infected at this time.  cbc stable.  plan continue prn analgesia and outpatient treatment.